# Patient Record
(demographics unavailable — no encounter records)

---

## 2022-01-01 NOTE — PCM.NBADM
Stringer History





-  Admission Detail


Date of Service: 22


Infant Delivery Method: Spontaneous Vaginal Delivery-Single





- Maternal History


Estimated Date of Confinement: 22


: 2


Term: 1


: 0


Abortions: 1


Live Births: 1


Mother's Blood Type: O


Mother's Rh: Positive


Maternal Hepatitis B: Negative


Maternal Hepatitis C: Unknown


Maternal STD: Negative


Maternal HIV: Negative


Maternal Group Beta Strep/GBS: Negative


Maternal VDRL: Negative


Maternal Urine Toxicology: Negative


Prenatal Care Received: Yes


MD Office Called for Prenatal Records: Yes


Labs Drawn if Required: Yes


Maternal History Comment: rubella non-immune; post-partum colposcopy planned





- Delivery Data


Delivery Data: 





Normal spontaneous vaginal delivery at 39 weeks to a 26 y.o. G2 now P1 

female. Apgars 9/9 at 1 and 5 minutes respectively. Delayed cord clamping by ~3 

minutes. 3 vessel cord. Clear amniotic fluid. Appears appropriate for 

gestational age. Weight has not been measured. 


Resuscitation Effort: Dried and Stimulated (on mother's chest and promoted early

skin to skin contact and breast feeding.)


Infant Delivery Method: Spontaneous Vaginal Delivery





Stringer Nursery Information


Gestation Age (Weeks,Days): Weeks (39), Days (0)


Sex, Infant: Male


Cry Description: Strong, Lusty


Rosana Reflex: Normal Response


Suck Reflex: Normal Response


Heart Rate Apical: 130


Anomalies Noted: none


Birth Complications: None





Stringer Physician Exam





- Exam


Exam: See Below


Activity: Active


Resting Posture: Flexion


Head: Face Symmetrical, Molding, [place] Soft, Sutures Overriding.  No: 

Bruising, Caput Succedaneum, Scalp Hematoma


Eyes: Bilateral: Normal Inspection, Red Reflex, Positive, Pupil Equal


Ears: Normal Appearance, Symmetrical.  No: Preauriclar Pit(s), Skin Tag(s)


Nose: Normal Inspection, Normal Mucosa


Mouth: Nnormal Inspection, Palate Intact


Neck: Normal Inspection, Trachea Midline.  No: Neck Masses


Chest/Cardiovascular: Normal Appearance, Normal Peripheral Pulses, Regular Heart

Rate, Symmetrical, Clavicles Intact


Respiratory: Lungs Clear, Normal Breath Sounds, No Respiratoy Distress.  No: 

Stridor, Retractions


Abdomen/GI: Normal Bowel Sounds, Pelvis Stable, Soft.  No: Umbilical Hernia


Rectal: Normal Exam


Genitalia (Male): Normal Inspection


Spine/Skeletal: Normal Inspection.  No: Sacral Dimple, Tuft or Hair


Extremities: Normal Inspection, Normal Capillary Refill, Normal Range of Motion


Skin: Dry, Intact





 Assessment and Plan


(1) Liveborn infant by vaginal delivery


SNOMED Code(s): 726048471, 020669791


   Code(s): Z38.00 - SINGLE LIVEBORN INFANT, DELIVERED VAGINALLY   Status: Acute

  Current Visit: Yes   


Assessment:: 


Term, appropriate for gestational age





Problem List Initiated/Reviewed/Updated: Yes


Orders (Last 24 Hours): 


                               Active Orders 24 hr











 Category Date Time Status


 


 Patient Status [ADT] Routine ADT  22 03:24 Ordered


 


 Communication Order [RC] ASDIRECTED Care  22 03:24 Ordered


 


 Communication Order [RC] ASDIRECTED Care  22 03:24 Ordered


 


 Communication Order [RC] PER UNIT ROUTINE Care  22 03:24 Ordered


 


 Stringer Hearing Screen [RC] ASDIRECTED Care  22 03:24 Ordered


 


 Stringer Intake and Output [RC] QSHIFT Care  22 03:24 Ordered


 


 Notify Provider [RC] PRN Care  22 03:24 Ordered


 


 Vaccine to be Administered/Admin Charge [RC] ASDIRECTED Care  22 03:29 

Ordered


 


 Verify Patient Consent Obtain [RC] ASDIRECTED Care  22 03:24 Ordered


 


 Vital Measures,  [RC] Per Unit Routine Care  22 03:24 Ordered


 


 CBC W/O DIFF,HEMOGRAM [HEME] Routine Lab  22 03:24 Ordered


 


 CORD BLOOD EVALUATION [BBK] Stat Lab  22 03:24 Ordered


 


  SCREENING (STATE) [POC] Routine Lab  22 03:24 Ordered


 


 Erythromycin Base [Erythromycin 0.5% Ophth Oint] Med  22 03:24 Once





 1 gm EYEBOTH ONETIME ONE   


 


 Hepatitis B Virus Vaccine PF [Engerix-B (Pediatric)] Med  22 03:24 Once





 10 mcg IM .ONCE ONE   


 


 Phytonadione [AquaMephyton] Med  22 03:24 Once





 1 mg IM ONETIME ONE   


 


 Facility Protocol [COMM] Per Unit Routine Oth  22 03:24 Ordered


 


 Transcutaneous Bilirubinometer [OM.PC] Routine Oth  22 03:24 Ordered


 


 Resuscitation Status Routine Resus Stat  22 03:24 Ordered











Plan: 





Routine  care. Cord blood sent. Erythromycin eye ointment and vitamin K. 

Routine hearing screening and  metabolic screening. Encourage Hep B 

vaccine before discharge. Discuss desire for circumcision. Routine monitoring.

## 2022-01-01 NOTE — PCM.PNNB
- General Info


Date of Service: 22 (Birthday plus one D/C)





- Patient Data


Vital Signs: 


                                Last Vital Signs











Temp  97.0 F   22 02:58


 


Pulse  114   22 02:58


 


Resp  40   22 02:58


 


BP      


 


Pulse Ox      











Weight: 6 lb 6 oz


I&O Last 24 Hours: 


                                 Intake & Output











 22





 22:59 06:59 14:59


 


Intake Total 110 85 


 


Balance 110 85 











Current Medications: 


                               Current Medications








Discontinued Medications





Erythromycin (Erythromycin Base 0.5% Ophth Oint 1 Gm Tube)  1 gm EYEBOTH ONETIME

ONE


   Stop: 22 03:25


   Last Admin: 22 05:08 Dose:  1 gm


   Documented by: 


Hepatitis B Vaccine (Hepatitis B Virus Vaccine Pf (Pediatric) 10 Mcg/0.5 Ml 

Syringe)  10 mcg IM .ONCE ONE


   Stop: 22 02:01


Lidocaine HCl (Lidocaine 1% Pf 2 Ml Sdv)  1 ml INJECT ONETIME ONE


   Stop: 22 08:01


   Last Admin: 22 09:46 Dose:  1 ml


   Documented by: 


Phytonadione (Phytonadione 1 Mg/0.5 Ml Amp)  1 mg IM ONETIME ONE


   Stop: 22 03:25


   Last Admin: 22 05:08 Dose:  1 mg


   Documented by: 


Povidone Iodine (Povidone-Iodine 10% Soln 118.25 Ml Bottle)  5 ml TOP ONETIME 

ONE


   Stop: 22 08:01


   Last Admin: 22 09:46 Dose:  1 ml


   Documented by: 











- General/Neuro


Activity: Active


Resting Posture: Flexion





- Exam


Eyes: Bilateral: Normal Inspection


Ears: Normal Appearance, Symmetrical


Nose: Normal Inspection, Normal Mucosa


Mouth: Nnormal Inspection, Palate Intact


Chest/Cardiovascular: Normal Appearance, Normal Peripheral Pulses, Regular Heart

Rate, Symmetrical


Respiratory: Lungs Clear, Normal Breath Sounds, No Respiratoy Distress


Abdomen/GI: Soft


Genitalia (Male): Reports: Normal Inspection


Extremities: Normal Inspection, Normal Capillary Refill, Normal Range of Motion


Skin: Dry, Intact, Normal Color, Warm





- Subjective


Note: 


22


Healthy  male


bottle feeding until mother's milk comes in.


circumcision today


6-6, weight


passed hearing, CHD and PKU done


Hep B given








Dorset Circumcision





- Circumcision Procedure


Time Out Performed: Yes


Circumcision Performed By: Sissy Bautista


Brief description of procedure: 


22





Circumcision note:


Informed consent obtained. I reviewed the procedure with the parents. We 

discussed risks and benefits, risks of injury, bleeding, and or adhesions.


Mother signed consent.





Anesthesia:


A dorsal penile block and sweet toot were used with good results. 1% lidocaine 

was used as the local agent. 





Procedure:


A Josue clamp was used in standard fashion. No complications were encountered. 

EBL: zero





Parents instructed in cares. Nursing to check diaper every 15 minutes times one 

hour.


I applied Vaseline to penis


Baby to parents in good condition








Anesthesia: Lidocaine 1%


Device Used: josue clamp


Dressing: petroleum gauze


Dressing applied by: by provider


Estimated Blood Loss: 0


Complications: No


Condition: Good





- Problem List & Annotations


(1) Male circumcision


SNOMED Code(s): 895986908


   Code(s): Z41.2 - ENCOUNTER FOR ROUTINE AND RITUAL MALE CIRCUMCISION   Status:

Acute   Current Visit: Yes   





(2) Liveborn infant by vaginal delivery


SNOMED Code(s): 621305630, 948251559


   Code(s): Z38.00 - SINGLE LIVEBORN INFANT, DELIVERED VAGINALLY   Status: Acute

  Current Visit: Yes   





- Problem List Review


Problem List Initiated/Reviewed/Updated: Yes





- My Orders


Last 24 Hours: 


My Active Orders





22 08:00


Circumcision Care [RC] ASDIRECTED 














- Assessment


Assessment:: 


22


Healthy  male


bottle feeding


circumcision








- Plan


Plan:: 





Routine  care. Cord blood sent. Erythromycin eye ointment and vitamin K. 

Routine hearing screening and  metabolic screening. Encourage Hep B 

vaccine before discharge. Discuss desire for circumcision. Routine monitoring. 


-------------------------


22


All screening tests completed and ppassed


Hep B done and pku as well


ready for discharge


See me Weds in clinic for weight check.